# Patient Record
Sex: FEMALE | Race: WHITE | Employment: UNEMPLOYED | URBAN - METROPOLITAN AREA
[De-identification: names, ages, dates, MRNs, and addresses within clinical notes are randomized per-mention and may not be internally consistent; named-entity substitution may affect disease eponyms.]

---

## 2017-09-03 ENCOUNTER — TRANSFERRED RECORDS (OUTPATIENT)
Dept: HEALTH INFORMATION MANAGEMENT | Facility: CLINIC | Age: 52
End: 2017-09-03

## 2017-09-03 ENCOUNTER — HOSPITAL ENCOUNTER (OUTPATIENT)
Dept: CT IMAGING | Facility: HOSPITAL | Age: 52
Discharge: HOME | End: 2017-09-03

## 2017-09-03 DIAGNOSIS — N20.0 STONE IN KIDNEY: ICD-10-CM

## 2019-06-05 ENCOUNTER — HOSPITAL ENCOUNTER (EMERGENCY)
Facility: CLINIC | Age: 54
Discharge: HOME | End: 2019-06-05
Attending: EMERGENCY MEDICINE | Admitting: EMERGENCY MEDICINE
Payer: COMMERCIAL

## 2019-06-05 VITALS
TEMPERATURE: 98.1 F | RESPIRATION RATE: 16 BRPM | WEIGHT: 110 LBS | BODY MASS INDEX: 20.24 KG/M2 | DIASTOLIC BLOOD PRESSURE: 92 MMHG | SYSTOLIC BLOOD PRESSURE: 146 MMHG | OXYGEN SATURATION: 99 % | HEIGHT: 62 IN | HEART RATE: 93 BPM

## 2019-06-05 DIAGNOSIS — R31.0 GROSS HEMATURIA: ICD-10-CM

## 2019-06-05 PROBLEM — F32.A DEPRESSION: Status: ACTIVE | Noted: 2019-06-05

## 2019-06-05 LAB
ALBUMIN UR-MCNC: 100 MG/DL
APPEARANCE UR: ABNORMAL
BILIRUB UR QL STRIP: NEGATIVE
CAOX CRY #/AREA URNS HPF: ABNORMAL /HPF
COLOR UR AUTO: ABNORMAL
GLUCOSE UR STRIP-MCNC: NEGATIVE MG/DL
HGB UR QL STRIP: ABNORMAL
KETONES UR STRIP-MCNC: NEGATIVE MG/DL
LEUKOCYTE ESTERASE UR QL STRIP: NEGATIVE
NITRATE UR QL: NEGATIVE
PH UR STRIP: 7 PH (ref 5–7)
RBC #/AREA URNS AUTO: >182 /HPF (ref 0–2)
SOURCE: ABNORMAL
SP GR UR STRIP: 1.02 (ref 1–1.03)
UROBILINOGEN UR STRIP-MCNC: 0 MG/DL (ref 0–2)
WBC #/AREA URNS AUTO: >182 /HPF (ref 0–5)
WBC CLUMPS #/AREA URNS HPF: PRESENT /HPF

## 2019-06-05 PROCEDURE — 87086 URINE CULTURE/COLONY COUNT: CPT | Performed by: EMERGENCY MEDICINE

## 2019-06-05 PROCEDURE — 81001 URINALYSIS AUTO W/SCOPE: CPT | Performed by: STUDENT IN AN ORGANIZED HEALTH CARE EDUCATION/TRAINING PROGRAM

## 2019-06-05 PROCEDURE — 99284 EMERGENCY DEPT VISIT MOD MDM: CPT | Mod: Z6 | Performed by: EMERGENCY MEDICINE

## 2019-06-05 PROCEDURE — 99283 EMERGENCY DEPT VISIT LOW MDM: CPT | Performed by: EMERGENCY MEDICINE

## 2019-06-05 ASSESSMENT — ENCOUNTER SYMPTOMS
LIGHT-HEADEDNESS: 0
FATIGUE: 0
ABDOMINAL PAIN: 0
FEVER: 0
DYSURIA: 0
SHORTNESS OF BREATH: 0
CHILLS: 0
HEMATURIA: 1
NAUSEA: 0
CHEST TIGHTNESS: 0
FREQUENCY: 0
BACK PAIN: 0
FLANK PAIN: 0

## 2019-06-05 ASSESSMENT — MIFFLIN-ST. JEOR: SCORE: 1067.21

## 2019-06-05 NOTE — ED AVS SNAPSHOT
[facility] Emergency Department  [address] [address]  Phone:  [phone]  Fax:  [phone]                                    Nevin Gonsales   MRN: [id number]    Department:  [facility] Emergency Department   Date of Visit:  6/5/2019           After Visit Summary Signature Page    I have received my discharge instructions, and my questions have been answered. I have discussed any challenges I see with this plan with the nurse or doctor.    ..........................................................................................................................................  Patient/Patient Representative Signature      ..........................................................................................................................................  Patient Representative Print Name and Relationship to Patient    ..................................................               ................................................  Date                                   Time    ..........................................................................................................................................  Reviewed by Signature/Title    ...................................................              ..............................................  Date                                               Time          22EPIC Rev 08/18

## 2019-06-06 NOTE — ED PROVIDER NOTES
History     Chief Complaint   Patient presents with     Hematuria     having some pressure in abdomen but no symptoms of UTI, hx of kidney stones, no flank pain     HPI  Nevin Gonsales is a 53 year old female with a history of frequent kidney stones presenting for evaluation of painless gross hematuria tonight.  Patient reports she has been feeling well recently with no recent symptoms of kidney stones.  Tonight she went to urinate and felt some pressure and release and noticed some blood and possible blood clots or tissue within the toilet bowl.  Denies any dysuria, urgency, or frequency.  Denies any flank pain or abdominal pain that she has normally had with her kidney stones.  She does report she is passed innumerable stones in the past but usually has pain with them.  No history of easy bruising or bleeding.  Not on any anticoagulation.  Denies any abdominal trauma.  Denies any recent weight loss or weight gain.  No history of cancers.    Allergies:  Allergies   Allergen Reactions     Percocet [Oxycodone-Acetaminophen] Nausea and Vomiting       Problem List:    Patient Active Problem List    Diagnosis Date Noted     Depression 06/05/2019     Priority: Medium     Borderline personality disorder (H) 05/28/2015     Priority: Medium     Kidney stones 10/30/2013     Priority: Medium     MYKE I (cervical intraepithelial neoplasia I) 06/05/2013     Priority: Medium     05/13/2013 LSIL  06/05/2013 Brothers: MYKE I   02/08/2016 ASCUS/HPV+  12/14/2016 Brothers: Benign  07/09/2018 LSIL/HPV+  01/08/2019 Brothers: MYKE 1    Plan: Repeat Pap/HPV testing in 1 year (DUE 1/2020)       Dysmenorrhea 08/16/2012     Priority: Medium     Hypertension 07/17/2010     Priority: Medium     Generalized anxiety disorder 04/30/2010     Priority: Medium     Major depressive disorder, recurrent episode, moderate (H) 04/30/2010     Priority: Medium        Past Medical History:    No past medical history on file.    Past Surgical History:    No past  "surgical history on file.    Family History:    No family history on file.    Social History:  Marital Status:   [2]  Social History     Tobacco Use     Smoking status: Not on file   Substance Use Topics     Alcohol use: Not on file     Drug use: Not on file        Medications:      No current outpatient medications on file.      Review of Systems   Constitutional: Negative for chills, fatigue and fever.   HENT: Negative for congestion.    Respiratory: Negative for chest tightness and shortness of breath.    Cardiovascular: Negative for chest pain.   Gastrointestinal: Negative for abdominal pain and nausea.   Genitourinary: Positive for hematuria. Negative for dysuria, flank pain, frequency, pelvic pain, urgency, vaginal bleeding and vaginal discharge.   Musculoskeletal: Negative for back pain.   Skin: Negative for rash.   Neurological: Negative for light-headedness.   All other systems reviewed and are negative.      Physical Exam   BP: (!) 146/92  Pulse: 93  Temp: 98.1  F (36.7  C)  Resp: 16  Height: 157.5 cm (5' 2\")  Weight: 49.9 kg (110 lb)  SpO2: 99 %      Physical Exam   Constitutional: She is oriented to person, place, and time. She appears well-developed and well-nourished. No distress.   HENT:   Head: Normocephalic and atraumatic.   Cardiovascular: Normal rate.   Pulmonary/Chest: Effort normal.   Abdominal: Soft. She exhibits no mass. There is no tenderness. There is no CVA tenderness.   Neurological: She is alert and oriented to person, place, and time.   Skin: Skin is warm and dry. Capillary refill takes less than 2 seconds.   Psychiatric: She has a normal mood and affect.   Nursing note and vitals reviewed.      ED Course        Procedures           Results for orders placed or performed during the hospital encounter of 06/05/19 (from the past 24 hour(s))   UA reflex to Microscopic   Result Value Ref Range    Color Urine Red     Appearance Urine Cloudy     Glucose Urine Negative NEG^Negative mg/dL "    Bilirubin Urine Negative NEG^Negative    Ketones Urine Negative NEG^Negative mg/dL    Specific Gravity Urine 1.016 1.003 - 1.035    Blood Urine Large (A) NEG^Negative    pH Urine 7.0 5.0 - 7.0 pH    Protein Albumin Urine 100 (A) NEG^Negative mg/dL    Urobilinogen mg/dL 0.0 0.0 - 2.0 mg/dL    Nitrite Urine Negative NEG^Negative    Leukocyte Esterase Urine Negative NEG^Negative    Source Midstream Urine     RBC Urine >182 (H) 0 - 2 /HPF    WBC Urine >182 (H) 0 - 5 /HPF    WBC Clumps Present (A) NEG^Negative /HPF    Calcium Oxalate Few (A) NEG^Negative /HPF       Medications - No data to display    Assessments & Plan (with Medical Decision Making)  53-year-old female with history of multiple previous kidney stones presenting for evaluation of painless hematuria tonight.  No recent symptoms of kidney stones.  No recent urinary tract infection symptoms.  No history of cancers or weight loss to suggest a renal or bladder cancer.  Urinating well with no signs of urinary obstruction.  Differentials include a small kidney stones, early asymptomatic cystitis (urine culture ordered from today's urine sample), bladder or renal cancer.  Advised to hydrate and monitor for resolution considering that a small kidney stone causing irritation and bleeding is the most likely cause of her symptoms.  Advised patient and spouse that if symptoms continue or she has a waxing waning continuation of her bleeding, she should follow-up with urology for further evaluation given the consideration for other causes of her hematuria.  Also advised that if she develops any urinary obstruction or other concerning symptoms of blood loss such as lightheadedness, weakness, trouble breathing, or dizziness, to return to the ED for repeat evaluation.  Did discuss consideration for blood work to recheck her kidney function as this is not been done since July.  Patient declined stating she will follow-up with her primary doctor for this next week.     I  have reviewed the nursing notes.    I have reviewed the findings, diagnosis, plan and need for follow up with the patient.          Medication List      There are no discharge medications for this visit.         Final diagnoses:   Gross hematuria       6/5/2019   [facility] EMERGENCY DEPARTMENT     Estrada, Linus Khan MD  06/05/19 1299

## 2019-06-07 LAB
BACTERIA SPEC CULT: NO GROWTH
Lab: NORMAL
SPECIMEN SOURCE: NORMAL

## 2019-06-07 NOTE — RESULT ENCOUNTER NOTE
Final urine culture report is NEGATIVE per [facility] Result protocol.    If NEGATIVE result, no change in treatment, per [facility] Result protocol.

## 2019-07-02 ENCOUNTER — OFFICE VISIT (OUTPATIENT)
Dept: UROLOGY | Facility: CLINIC | Age: 54
End: 2019-07-02
Payer: COMMERCIAL

## 2019-07-02 VITALS
WEIGHT: 110 LBS | HEIGHT: 62 IN | SYSTOLIC BLOOD PRESSURE: 103 MMHG | BODY MASS INDEX: 20.24 KG/M2 | RESPIRATION RATE: 16 BRPM | HEART RATE: 83 BPM | DIASTOLIC BLOOD PRESSURE: 68 MMHG

## 2019-07-02 DIAGNOSIS — N20.9 URINARY TRACT STONES: ICD-10-CM

## 2019-07-02 DIAGNOSIS — R31.0 GROSS HEMATURIA: Primary | ICD-10-CM

## 2019-07-02 PROCEDURE — 99203 OFFICE O/P NEW LOW 30 MIN: CPT | Mod: 25 | Performed by: UROLOGY

## 2019-07-02 PROCEDURE — 52000 CYSTOURETHROSCOPY: CPT | Performed by: UROLOGY

## 2019-07-02 PROCEDURE — 51798 US URINE CAPACITY MEASURE: CPT | Performed by: UROLOGY

## 2019-07-02 RX ORDER — TAMSULOSIN HYDROCHLORIDE 0.4 MG/1
CAPSULE ORAL
Refills: 0 | COMMUNITY
Start: 2018-10-14

## 2019-07-02 RX ORDER — POTASSIUM CITRATE 15 MEQ/1
15 TABLET, EXTENDED RELEASE ORAL
COMMUNITY
Start: 2019-05-14

## 2019-07-02 RX ORDER — TRAZODONE HYDROCHLORIDE 50 MG/1
50 TABLET, FILM COATED ORAL
COMMUNITY
Start: 2015-05-28

## 2019-07-02 RX ORDER — CHLORTHALIDONE 25 MG/1
25 TABLET ORAL
COMMUNITY
Start: 2019-05-14

## 2019-07-02 RX ORDER — GABAPENTIN 300 MG/1
300 CAPSULE ORAL
COMMUNITY
Start: 2016-06-15

## 2019-07-02 RX ORDER — TETRACYCLINE HCL 500 MG
CAPSULE ORAL
COMMUNITY
Start: 2019-05-14

## 2019-07-02 RX ORDER — IBUPROFEN 200 MG
200 TABLET ORAL
COMMUNITY

## 2019-07-02 RX ORDER — VENLAFAXINE HYDROCHLORIDE 150 MG/1
150 CAPSULE, EXTENDED RELEASE ORAL
COMMUNITY
Start: 2016-01-21

## 2019-07-02 RX ORDER — DIPHENOXYLATE HYDROCHLORIDE AND ATROPINE SULFATE 2.5; .025 MG/1; MG/1
1 TABLET ORAL
COMMUNITY
Start: 2018-07-09

## 2019-07-02 ASSESSMENT — MIFFLIN-ST. JEOR: SCORE: 1067.21

## 2019-07-02 NOTE — NURSING NOTE
Pt brought back to the procedure room for a cystoscopy per Dr. Lowry Informed consent obtained, pt prepped in a sterile manner and a uro jet was used.      Scope serial number: [id number] Angelina DALY CMA ([facility])

## 2019-07-02 NOTE — NURSING NOTE
"Chief Complaint   Patient presents with     Consult     hx of kidney stones and hematuria       Initial /68 (BP Location: Left arm, Patient Position: Chair, Cuff Size: Adult Regular)   Pulse 83   Resp 16   Ht 1.575 m (5' 2\")   Wt 49.9 kg (110 lb)   BMI 20.12 kg/m   Estimated body mass index is 20.12 kg/m  as calculated from the following:    Height as of this encounter: 1.575 m (5' 2\").    Weight as of this encounter: 49.9 kg (110 lb).  Medications and allergies reviewed.    Noah DALY CMA ([facility])    "

## 2019-07-02 NOTE — PROGRESS NOTES
Appointment source: New Patient  Patient name: Nevin Gonsales  Urology Staff: Kt Lowry MD    Subjective: This is a 53 year old year old female complaining of gross hematuria.    Developed sense of urgency and passed clots and red urine. Not menstruating at the time.    Strong history of urinary tract stones but no colic at time of hematuria.    Distant history of smoking.    Objective:  Cystoscopy was normal. Limited bimanual examination normal.      Urine culture at the time of the gross hematuria was normal.    Assessment:  Gross hematuria. History of recurrent urinary tract stones.     Plan:  Complete evaluation of gross hematuria with CT scan which will also provide surveillance of current stone burden.    Total time 30 minutes, counseling 20 minutes discussing hematuria and urinary tract stones.

## 2019-07-02 NOTE — NURSING NOTE
Active order to obtain bladder scan? Yes   Name of ordering provider:  Dr. Lowry  Bladder scan preformed post void Yes  Bladder scan revealed 0ML  Provider notified?  Yes    Noah DALY CMA

## 2019-07-02 NOTE — PATIENT INSTRUCTIONS
Per physician instructions.    If you have questions or concerns on any instructions given to you by your provider today or if you need to schedule an appointment, you can reach us at [phone].    Thank you!

## 2019-07-05 ENCOUNTER — HOSPITAL ENCOUNTER (OUTPATIENT)
Dept: CT IMAGING | Facility: CLINIC | Age: 54
Discharge: HOME | End: 2019-07-05
Attending: UROLOGY | Admitting: UROLOGY
Payer: COMMERCIAL

## 2019-07-05 DIAGNOSIS — R31.0 GROSS HEMATURIA: ICD-10-CM

## 2019-07-05 PROCEDURE — 74178 CT ABD&PLV WO CNTR FLWD CNTR: CPT

## 2019-07-05 PROCEDURE — 25000125 ZZHC RX 250: Performed by: RADIOLOGY

## 2019-07-05 PROCEDURE — 25000128 H RX IP 250 OP 636: Performed by: RADIOLOGY

## 2019-07-05 RX ORDER — IOPAMIDOL 755 MG/ML
100 INJECTION, SOLUTION INTRAVASCULAR ONCE
Status: COMPLETED | OUTPATIENT
Start: 2019-07-05 | End: 2019-07-05

## 2019-07-05 RX ADMIN — SODIUM CHLORIDE 80 ML: 9 INJECTION, SOLUTION INTRAVENOUS at 09:11

## 2019-07-05 RX ADMIN — IOPAMIDOL 100 ML: 755 INJECTION, SOLUTION INTRAVENOUS at 09:11

## 2019-07-10 ENCOUNTER — TELEPHONE (OUTPATIENT)
Dept: UROLOGY | Facility: CLINIC | Age: 54
End: 2019-07-10

## 2019-07-10 NOTE — TELEPHONE ENCOUNTER
Pt advised provider out of town and should hear early next week regarding result.  Isabel Silvestre RN  [facility]

## 2019-07-10 NOTE — TELEPHONE ENCOUNTER
Reason for Call:  Request for results:    Name of test or procedure: CT     Date of test of procedure: 7/5    Location of the test or procedure: [place]    OK to leave the result message on voice mail or with a family member? YES    Phone number Patient can be reached at:  Home number on file [phone] (home)    Additional comments: pt calling for CT results    Call taken on 7/10/2019 at 10:01 AM by Naomi Carcamo

## 2019-07-22 ENCOUNTER — PRE VISIT (OUTPATIENT)
Dept: UROLOGY | Facility: CLINIC | Age: 54
End: 2019-07-22

## 2019-07-22 NOTE — TELEPHONE ENCOUNTER
Chief Complaint : New-    Hx/Sx: Hematuria     Records/Orders: Available; pt had cysto 7/2/19    Pt Contacted: N/A    At Rooming: Normal

## 2019-07-22 NOTE — TELEPHONE ENCOUNTER
MEDICAL RECORDS REQUEST   [facility]  Urology Clinic  [address] [place] [address]  PHONE: [phone]  Fax: [phone]        FUTURE VISIT INFORMATION                                                   Nevin Ilda, : 1965 scheduled for future visit at [facility]    APPOINTMENT INFORMATION:    Date: 19 9AM     Provider:  Adonay Dupont MD     Reason for Visit/Diagnosis: Gross Hematuria     REFERRAL INFORMATION:    Referring provider:  Kt Lowry     Specialty: MD     Referring providers clinic:  [facility]     Clinic contact number:  [phone]    RECORDS REQUESTED FOR VISIT                                                     NOTES  STATUS/DETAILS   OFFICE NOTE from referring provider  yes   OFFICE NOTE from other specialist  no   DISCHARGE SUMMARY from hospital  no   DISCHARGE REPORT from the ER  no   OPERATIVE REPORT  yes   MEDICATION LIST  no   LABS     URINALYSIS (UA)  yes     PRE-VISIT CHECKLIST      Record collection complete Yes- All recs in Epic   Images in  PACS   19 CT Abd Pelvis    9/3/17 CT Abd Pelvis    Appointment appropriately scheduled           (right time/right provider) Yes   MyChart activation If no, please explain: In process    Questionnaire complete If no, please explain: In process      Completed by: Milagros Ashton

## 2019-07-23 ENCOUNTER — MEDICAL CORRESPONDENCE (OUTPATIENT)
Dept: HEALTH INFORMATION MANAGEMENT | Facility: CLINIC | Age: 54
End: 2019-07-23

## 2019-07-23 ENCOUNTER — PRE VISIT (OUTPATIENT)
Dept: UROLOGY | Facility: CLINIC | Age: 54
End: 2019-07-23

## 2019-07-23 ENCOUNTER — OFFICE VISIT (OUTPATIENT)
Dept: UROLOGY | Facility: CLINIC | Age: 54
End: 2019-07-23
Payer: COMMERCIAL

## 2019-07-23 VITALS
BODY MASS INDEX: 20.3 KG/M2 | HEART RATE: 77 BPM | DIASTOLIC BLOOD PRESSURE: 81 MMHG | WEIGHT: 111 LBS | SYSTOLIC BLOOD PRESSURE: 127 MMHG | RESPIRATION RATE: 16 BRPM

## 2019-07-23 DIAGNOSIS — N20.0 KIDNEY STONE: Primary | ICD-10-CM

## 2019-07-23 DIAGNOSIS — N20.0 KIDNEY STONE: ICD-10-CM

## 2019-07-23 PROBLEM — R51.9 HA (HEADACHE): Status: ACTIVE | Noted: 2019-07-23

## 2019-07-23 LAB
ANION GAP SERPL CALCULATED.3IONS-SCNC: 4 MMOL/L (ref 3–14)
BUN SERPL-MCNC: 28 MG/DL (ref 7–30)
CALCIUM SERPL-MCNC: 9.6 MG/DL (ref 8.5–10.1)
CHLORIDE SERPL-SCNC: 102 MMOL/L (ref 94–109)
CO2 SERPL-SCNC: 31 MMOL/L (ref 20–32)
CREAT SERPL-MCNC: 0.84 MG/DL (ref 0.52–1.04)
DEPRECATED CALCIDIOL+CALCIFEROL SERPL-MC: 35 UG/L (ref 20–75)
GFR SERPL CREATININE-BSD FRML MDRD: 81 ML/MIN/{1.73_M2}
GLUCOSE SERPL-MCNC: 94 MG/DL (ref 70–99)
POTASSIUM SERPL-SCNC: 4.1 MMOL/L (ref 3.4–5.3)
SODIUM SERPL-SCNC: 137 MMOL/L (ref 133–144)
URATE SERPL-MCNC: 4.3 MG/DL (ref 2.6–6)

## 2019-07-23 ASSESSMENT — PAIN SCALES - GENERAL: PAINLEVEL: NO PAIN (0)

## 2019-07-23 NOTE — PROGRESS NOTES
Urology Clinic    Adonay Dupont MD  Date of Service: 2019     Name: Nevin Gonsales  MRN: [id number]  Age: 53 year old  : 1965  Referring provider: Referred Self     Assessment and Plan:  Kidney stone  - Litholink: Clinic Lab Order  - Basic metabolic panel  - Uric acid  - Vitamin D deficiency screening       Assessment:  Nevin Gonsales  is a 53 year old female multiple non-obstructing bilateral renal stones.  She has had multiple stones over the years which she has generally been able to pass although she did require lithotripsy in 2013.  I recommend metabolic workup to further characterize her urine.    Plan:  BMP, uric acid, Vitamin D, and Litholink ordered today and I will see her back to discuss results and any possible lifestyle changes which could decrease future stone formation.     Follow up: RTC after Litholink    ---------------------------------------------------------------------------------------------------------------------    Chief Complaint:   Hematuria, kidney stones    HPI:   Nevin Gonsales  is a 53 year old female with a history of recurrent kidney stones and hematuria.  The patient reports a long history of kidney stones with passing at least 15 stones.  She did have a lithotripsy procedure in 2013.  Although she has stones on both side on CT scan, her flank and abdominal pain is always on her right side.  She has never had infections associated with her kidney stones.  Most recently she had an episode of gross hematuria, although she did not have any flank or abdominal pain associated with these.  Cystoscopy with Dr. Lowry was performed and was normal    She does not like to drink a lot of fluid as she feels that stirs up her stones and makes things worse.  No one has ever discussed a specific diet to prevent kidney stones.  Previous stones were primarily calcium oxalate.    Stone History is as Follows:  First stone: years ago  Number of stone surgeries:  one  Number of stones passed spontaneously: at least 15  History of UTI: no  Prior Stone Analysis: calcium oxalate  Family History Nephrolithiasis: yes, mother, sister  Prior Metabolic Workup: none     Review of Systems:   Pertinent items are noted in HPI or as below, remainder of complete ROS is negative.      Active Medications:      Apple Cider Vinegar 500 MG TABS, , Disp: , Rfl:      aspirin-acetaminophen-caffeine (EXCEDRIN EXTRA STRENGTH) 250-250-65 MG tablet, Take 1 tablet by mouth, Disp: , Rfl:      chlorthalidone (HYGROTON) 25 MG tablet, Take 25 mg by mouth, Disp: , Rfl:      gabapentin (NEURONTIN) 300 MG capsule, Take 300 mg by mouth, Disp: , Rfl:      ibuprofen (ADVIL/MOTRIN) 200 MG tablet, Take 200 mg by mouth, Disp: , Rfl:      Multiple Vitamin (MULTI-VITAMINS) TABS, Take 1 tablet by mouth, Disp: , Rfl:      potassium citrate 15 MEQ (1620 MG) TBCR, Take 15 mEq by mouth, Disp: , Rfl:      tamsulosin (FLOMAX) 0.4 MG capsule, TAKE 0.4 MG BY MOUTH ONCE DAILY AFTER A MEAL., Disp: , Rfl: 0     traZODone (DESYREL) 50 MG tablet, Take 50 mg by mouth, Disp: , Rfl:      venlafaxine (EFFEXOR XR) 150 MG 24 hr capsule, Take 150 mg by mouth, Disp: , Rfl:       Allergies:   Percocet       Past Medical History:  Kidney stones  Hypertension  Generalized anxiety disorder   Depression  MYKE I  Borderline personality  Major depressive disorder      Past Surgical History:  Colposcopy, 2013, 2016, 2019  Cystoscopy, left ureteroscopy with holmium laser lithotripsy, right ureteroscopy with basket extraction, right stent placement 2013  Endometrial ablation 2010  [place] teeth extraction    Family History:   Hypertension - brother, mother, sister   Prostate cancer - father  Alzheimer's disease - father    Stroke - mother   Breast cancer - paternal grandmother      Social History:   Presents to clinic alone.  Tobacco Use: Former smoker  Alcohol Use: No alcohol use.   PCP: Zelalem Silvestre      Physical Exam:   Patient is a 53  year old  female   Vitals: Blood pressure 127/81, pulse 77, resp. rate 16, weight 50.3 kg (111 lb).  Notable Findings on Exam: Well-nourished female in no apparent distress   No CVAT bilaterally     Imaging:   I have personally reviewed the results of the below imaging studies. The results were discussed with the patient.     Results for orders placed or performed during the hospital encounter of 07/05/19   CT Abdomen Pelvis w/o & w Contrast [IZK402]    Narrative    CT ABDOMEN PELVIS W/O & W CONTRAST 7/5/2019 9:30 AM    HISTORY: Gross hematuria.    CONTRAST:  100 mL Isovue 370.    TECHNIQUE: Abdomen and pelvis CT are performed without and with IV  contrast.    The non-IV enhanced the images are utilized mainly to assess for  urolithiasis or other pathological calcifications. On the delayed IV  enhanced portion of the study the kidneys, ureters, and bladder are  assessed.  The liver, spleen, pancreas, adrenal glands,  retroperitoneum, and abdominal aorta are also assessed. Finally,  pelvic anatomy is assessed on the pelvis portion of the CT.    Radiation dose for this scan is reduced using automated exposure  control, adjustment of mA and/or kV according to patient size, or  iterative reconstruction technique.    COMPARISON: None.    FINDINGS:    Abdomen: There are at least four nonobstructing calculi in the right  kidney. The largest one measures 3 mm. There are numerous  nonobstructing calculi in the left kidney. The largest one measures 5  mm. There is a 4.3 cm benign cyst in the left kidney. There also are a  few subcentimeter tiny low density lesions in each kidney which are  too small to characterize. Statistically they would represent cysts.  The ureters are segmentally opacified with excreted contrast. They  show no focal finding.    Pelvis: No bladder calculi are demonstrated. The bladder shows no  gross abnormality.      Impression    IMPRESSION:  Bilateral nonobstructing renal calculi are present.    NOMAN  MD MARU      Laboratory:   I personally reviewed all applicable laboratory data and went over findings with patient  Significant for:    Recent Labs   Lab Test 06/05/19  2204   SG 1.016   URINEPH 7.0   NITRITE Negative   RBCU >182*   WBCU >182*       Scribe Disclosure:  I, Hannah Bay, am serving as a scribe to document services personally performed by Adonay Dupont MD at this visit, based upon the provider's statements to me. All documentation has been reviewed by the aforementioned provider prior to being entered into the official medical record.            Answers for HPI/ROS submitted by the patient on 7/23/2019   General Symptoms: No  Skin Symptoms: No  HENT Symptoms: No  EYE SYMPTOMS: No  HEART SYMPTOMS: No  LUNG SYMPTOMS: No  INTESTINAL SYMPTOMS: No  URINARY SYMPTOMS: No  GYNECOLOGIC SYMPTOMS: No  BREAST SYMPTOMS: No  SKELETAL SYMPTOMS: No  BLOOD SYMPTOMS: No  NERVOUS SYSTEM SYMPTOMS: No  MENTAL HEALTH SYMPTOMS: No

## 2019-07-23 NOTE — NURSING NOTE
Chief Complaint   Patient presents with     Hematuria     Patient is here to discuss hematuria     Marsha Esteves CMA 9:55 AM on 7/23/2019.

## 2019-07-23 NOTE — LETTER
2019       RE: Nevin Gonsales  [address] [facility] [address]     Dear Colleague,    Thank you for referring your patient, Nevin Gonsales, to the [facility] UROLOGY AND INST FOR PROSTATE AND UROLOGIC CANCERS at [facility]. Please see a copy of my visit note below.      Urology Clinic    Adonay Dupont MD  Date of Service: 2019     Name: Nevin Gonsales  MRN: [id number]  Age: 53 year old  : 1965  Referring provider: Referred Self     Assessment and Plan:  Kidney stone  - Litholink: Clinic Lab Order  - Basic metabolic panel  - Uric acid  - Vitamin D deficiency screening       Assessment:  Nevin Gonsales  is a 53 year old female multiple non-obstructing bilateral renal stones.  She has had multiple stones over the years which she has generally been able to pass although she did require lithotripsy in .  I recommend metabolic workup to further characterize her urine.    Plan:  BMP, uric acid, Vitamin D, and Litholink ordered today and I will see her back to discuss results and any possible lifestyle changes which could decrease future stone formation.     Follow up: RTC after Litholink    ---------------------------------------------------------------------------------------------------------------------    Chief Complaint:   Hematuria, kidney stones    HPI:   Nevin Gonsales  is a 53 year old female with a history of recurrent kidney stones and hematuria.  The patient reports a long history of kidney stones with passing at least 15 stones.  She did have a lithotripsy procedure in .  Although she has stones on both side on CT scan, her flank and abdominal pain is always on her right side.  She has never had infections associated with her kidney stones.  Most recently she had an episode of gross hematuria, although she did not have any flank or abdominal pain associated with these.  Cystoscopy with Dr. Lowry was performed and was  normal    She does not like to drink a lot of fluid as she feels that stirs up her stones and makes things worse.  No one has ever discussed a specific diet to prevent kidney stones.  Previous stones were primarily calcium oxalate.    Stone History is as Follows:  First stone: years ago  Number of stone surgeries: one  Number of stones passed spontaneously: at least 15  History of UTI: no  Prior Stone Analysis: calcium oxalate  Family History Nephrolithiasis: yes, mother, sister  Prior Metabolic Workup: none     Review of Systems:   Pertinent items are noted in HPI or as below, remainder of complete ROS is negative.      Active Medications:      Apple Cider Vinegar 500 MG TABS, , Disp: , Rfl:      aspirin-acetaminophen-caffeine (EXCEDRIN EXTRA STRENGTH) 250-250-65 MG tablet, Take 1 tablet by mouth, Disp: , Rfl:      chlorthalidone (HYGROTON) 25 MG tablet, Take 25 mg by mouth, Disp: , Rfl:      gabapentin (NEURONTIN) 300 MG capsule, Take 300 mg by mouth, Disp: , Rfl:      ibuprofen (ADVIL/MOTRIN) 200 MG tablet, Take 200 mg by mouth, Disp: , Rfl:      Multiple Vitamin (MULTI-VITAMINS) TABS, Take 1 tablet by mouth, Disp: , Rfl:      potassium citrate 15 MEQ (1620 MG) TBCR, Take 15 mEq by mouth, Disp: , Rfl:      tamsulosin (FLOMAX) 0.4 MG capsule, TAKE 0.4 MG BY MOUTH ONCE DAILY AFTER A MEAL., Disp: , Rfl: 0     traZODone (DESYREL) 50 MG tablet, Take 50 mg by mouth, Disp: , Rfl:      venlafaxine (EFFEXOR XR) 150 MG 24 hr capsule, Take 150 mg by mouth, Disp: , Rfl:       Allergies:   Percocet       Past Medical History:  Kidney stones  Hypertension  Generalized anxiety disorder   Depression  MYKE I  Borderline personality  Major depressive disorder      Past Surgical History:  Colposcopy, 2013, 2016, 2019  Cystoscopy, left ureteroscopy with holmium laser lithotripsy, right ureteroscopy with basket extraction, right stent placement 2013  Endometrial ablation 2010  [place] teeth extraction    Family History:   Hypertension  - brother, mother, sister   Prostate cancer - father  Alzheimer's disease - father    Stroke - mother   Breast cancer - paternal grandmother      Social History:   Presents to clinic alone.  Tobacco Use: Former smoker  Alcohol Use: No alcohol use.   PCP: Zelalem Silvestre      Physical Exam:   Patient is a 53 year old  female   Vitals: Blood pressure 127/81, pulse 77, resp. rate 16, weight 50.3 kg (111 lb).  Notable Findings on Exam: Well-nourished female in no apparent distress   No CVAT bilaterally     Imaging:   I have personally reviewed the results of the below imaging studies. The results were discussed with the patient.     Results for orders placed or performed during the hospital encounter of 07/05/19   CT Abdomen Pelvis w/o & w Contrast [IGQ754]    Narrative    CT ABDOMEN PELVIS W/O & W CONTRAST 7/5/2019 9:30 AM    HISTORY: Gross hematuria.    CONTRAST:  100 mL Isovue 370.    TECHNIQUE: Abdomen and pelvis CT are performed without and with IV  contrast.    The non-IV enhanced the images are utilized mainly to assess for  urolithiasis or other pathological calcifications. On the delayed IV  enhanced portion of the study the kidneys, ureters, and bladder are  assessed.  The liver, spleen, pancreas, adrenal glands,  retroperitoneum, and abdominal aorta are also assessed. Finally,  pelvic anatomy is assessed on the pelvis portion of the CT.    Radiation dose for this scan is reduced using automated exposure  control, adjustment of mA and/or kV according to patient size, or  iterative reconstruction technique.    COMPARISON: None.    FINDINGS:    Abdomen: There are at least four nonobstructing calculi in the right  kidney. The largest one measures 3 mm. There are numerous  nonobstructing calculi in the left kidney. The largest one measures 5  mm. There is a 4.3 cm benign cyst in the left kidney. There also are a  few subcentimeter tiny low density lesions in each kidney which are  too small to  characterize. Statistically they would represent cysts.  The ureters are segmentally opacified with excreted contrast. They  show no focal finding.    Pelvis: No bladder calculi are demonstrated. The bladder shows no  gross abnormality.      Impression    IMPRESSION:  Bilateral nonobstructing renal calculi are present.    NOMAN MAHONEY MD      Laboratory:   I personally reviewed all applicable laboratory data and went over findings with patient  Significant for:    Recent Labs   Lab Test 06/05/19  2204   SG 1.016   URINEPH 7.0   NITRITE Negative   RBCU >182*   WBCU >182*       Scribe Disclosure:  I, Hannah Bay, am serving as a scribe to document services personally performed by Adonay Dupont MD at this visit, based upon the provider's statements to me. All documentation has been reviewed by the aforementioned provider prior to being entered into the official medical record.            Answers for HPI/ROS submitted by the patient on 7/23/2019   General Symptoms: No  Skin Symptoms: No  HENT Symptoms: No  EYE SYMPTOMS: No  HEART SYMPTOMS: No  LUNG SYMPTOMS: No  INTESTINAL SYMPTOMS: No  URINARY SYMPTOMS: No  GYNECOLOGIC SYMPTOMS: No  BREAST SYMPTOMS: No  SKELETAL SYMPTOMS: No  BLOOD SYMPTOMS: No  NERVOUS SYSTEM SYMPTOMS: No  MENTAL HEALTH SYMPTOMS: No      Again, thank you for allowing me to participate in the care of your patient.      Sincerely,    Adonay Dupont MD

## 2019-07-23 NOTE — PATIENT INSTRUCTIONS
Please complete the LithoLink 24hr Urine collection x2 . They will mail you the kit with instructions on completing the collection and mailing it back to them. If you do not receive the LithoLink in 7-10 days please call [phone]. Once you complete the kit and return it, and we get the results we will contact you to schedule a follow up appointment if one is not already made.    Please follow up with Dr. Dupont in eight (8) weeks.    Please have your blood work done on your way out today.    It was a pleasure meeting with you today.  Thank you for allowing me and my team the privilege of caring for you today.  YOU are the reason we are here, and I truly hope we provided you with the excellent service you deserve.  Please let us know if there is anything else we can do for you so that we can be sure you are leaving completely satisfied with your care experience.      Keegan Bales

## 2019-08-02 ENCOUNTER — TRANSFERRED RECORDS (OUTPATIENT)
Dept: HEALTH INFORMATION MANAGEMENT | Facility: CLINIC | Age: 54
End: 2019-08-02

## 2019-08-06 ENCOUNTER — DOCUMENTATION ONLY (OUTPATIENT)
Dept: CARE COORDINATION | Facility: CLINIC | Age: 54
End: 2019-08-06

## 2019-08-21 ENCOUNTER — PRE VISIT (OUTPATIENT)
Dept: UROLOGY | Facility: CLINIC | Age: 54
End: 2019-08-21

## 2019-08-21 NOTE — TELEPHONE ENCOUNTER
Reason for Visit: 6 week follow up    Diagnosis: Kidney stones    Orders/Procedures/Records: Records available, labs (7/23)    Contact Patient: N/A    Rooming Requirements: Kraig Simpson  08/21/19  10:05 AM

## 2019-09-03 ENCOUNTER — TRANSFERRED RECORDS (OUTPATIENT)
Dept: HEALTH INFORMATION MANAGEMENT | Facility: CLINIC | Age: 54
End: 2019-09-03

## 2019-09-03 ENCOUNTER — OFFICE VISIT (OUTPATIENT)
Dept: UROLOGY | Facility: CLINIC | Age: 54
End: 2019-09-03
Payer: COMMERCIAL

## 2019-09-03 VITALS — BODY MASS INDEX: 20.43 KG/M2 | WEIGHT: 111 LBS | HEIGHT: 62 IN

## 2019-09-03 DIAGNOSIS — N20.0 KIDNEY STONE: Primary | ICD-10-CM

## 2019-09-03 ASSESSMENT — PAIN SCALES - GENERAL: PAINLEVEL: NO PAIN (0)

## 2019-09-03 ASSESSMENT — MIFFLIN-ST. JEOR: SCORE: 1071.74

## 2019-09-03 NOTE — NURSING NOTE
"Chief Complaint   Patient presents with     Follow Up     Kidney stones       Height 1.575 m (5' 2\"), weight 50.3 kg (111 lb). Body mass index is 20.3 kg/m .    Patient Active Problem List   Diagnosis     Kidney stones     Hypertension     Generalized anxiety disorder     Dysmenorrhea     Depression     MYKE I (cervical intraepithelial neoplasia I)     Borderline personality disorder (H)     Major depressive disorder, recurrent episode, moderate (H)     HA (headache)     Low back pain       Allergies   Allergen Reactions     Percocet [Oxycodone-Acetaminophen] Nausea and Vomiting       Current Outpatient Medications   Medication Sig Dispense Refill     Apple Cider Vinegar 500 MG TABS        aspirin-acetaminophen-caffeine (EXCEDRIN EXTRA STRENGTH) 250-250-65 MG tablet Take 1 tablet by mouth       chlorthalidone (HYGROTON) 25 MG tablet Take 25 mg by mouth       gabapentin (NEURONTIN) 300 MG capsule Take 300 mg by mouth       ibuprofen (ADVIL/MOTRIN) 200 MG tablet Take 200 mg by mouth       Multiple Vitamin (MULTI-VITAMINS) TABS Take 1 tablet by mouth       potassium citrate 15 MEQ (1620 MG) TBCR Take 15 mEq by mouth       tamsulosin (FLOMAX) 0.4 MG capsule TAKE 0.4 MG BY MOUTH ONCE DAILY AFTER A MEAL.  0     traZODone (DESYREL) 50 MG tablet Take 50 mg by mouth       venlafaxine (EFFEXOR XR) 150 MG 24 hr capsule Take 150 mg by mouth         Social History     Tobacco Use     Smoking status: Former Smoker     Smokeless tobacco: Never Used   Substance Use Topics     Alcohol use: None     Drug use: None       Delfina Simpson, EMT  9/3/2019  12:05 PM     "

## 2019-09-03 NOTE — PROGRESS NOTES
Urology Clinic    Adonay Dupont MD  Date of Service: 2019     Name: Nevin Gonsales  MRN: [id number]  Age: 53 year old  : 1965  Referring provider: WIL Lowry     Assessment and Plan:  Assessment:  Nevin Gonsales  is a 53 year old female with multiple non-obstructing bilateral renal stones. She has had multiple stones over the years which she has generally been able to pass although she did require lithotripsy in . Recent 24 hour urine study showed low urine volume and high oxalates.     Plan:  We reviewed general recommendations to prevent kidney stones including the followin) High fluid intake. Recommend 3 liters of fluid daily to produce goal of 2.5L of urine.    2) Low oxalate diet. We discussed foods that are particularly high in oxalate including spinach, rhubarb, beets, nuts, nut butters, and black teas.     3) Low salt diet. Discussed common foods with high amounts of salt as well as dietary strategies to minimize sodium.     4) Modest animal protein. Recommend limiting animal protein to one serving or less daily.     5) Normal dietary calcium.    We discussed participation in the smart water bottle study. Our research coordinator will come over today to enroll her.     We will repeat at follow up 24 hour urine study in 2 months.   ______________________________________________________________________    HPI  Nevin Gonsales is a 53 year old female with a history of recurrent kidney stones and hematuria.  She reports a long history of kidney stones with passing at least 15 stones.  She did have a lithotripsy procedure in .  Although she has stones on both side on CT scan, her flank and abdominal pain is always on her right side.  She has never had infections associated with her kidney stones.  Most recently she had an episode of gross hematuria, although she did not have any flank or abdominal pain associated with these.  Cystoscopy with Dr. Lowry was performed  "and was normal     She does not like to drink a lot of fluid as she feels that stirs up her stones and makes things worse. Previous stones were primarily calcium oxalate. She presents today to discuss metabolic workup.      She is on chlorthalidone for blood pressure.     Stone History is as Follows:  First stone: years ago  Number of stone surgeries: one  Number of stones passed spontaneously: at least 15  History of UTI: no  Prior Stone Analysis: calcium oxalate  Family History Nephrolithiasis: yes, mother, sister  Prior Metabolic Workup: none      Review of Systems:   Pertinent items are noted in HPI or as below, remainder of complete ROS is negative.      Physical Exam:   Patient is a 53 year old  female   Vitals: Height 1.575 m (5' 2\"), weight 50.3 kg (111 lb).  Notable Findings on Exam: Well-nourished female in no apparent distress     Laboratory:   I personally reviewed all applicable laboratory data and went over findings with patient  Significant for:    Component      Latest Ref Rng & Units 7/23/2019   Vitamin D Deficiency screening      20 - 75 ug/L 35   Uric Acid      2.6 - 6.0 mg/dL 4.3     BMP RESULTS:  Recent Labs   Lab Test 07/23/19  1054      POTASSIUM 4.1   CHLORIDE 102   CO2 31   ANIONGAP 4   GLC 94   BUN 28   CR 0.84   GFRESTIMATED 81   GFRESTBLACK >90   BRENNEN 9.6       UA RESULTS:   Recent Labs   Lab Test 06/05/19  2204   SG 1.016   URINEPH 7.0   NITRITE Negative   RBCU >182*   WBCU >182*       CALCIUM RESULTS  Lab Results   Component Value Date    BRENNEN 9.6 07/23/2019         Imaging:   I personally reviewed all applicable imaging and went over the below findings with patient.    CT abdomen pelvis with & without contrast 07/05/2019:   FINDINGS:     Abdomen: There are at least four nonobstructing calculi in the right  kidney. The largest one measures 3 mm. There are numerous  nonobstructing calculi in the left kidney. The largest one measures 5  mm. There is a 4.3 cm benign cyst in the left " kidney. There also are a  few subcentimeter tiny low density lesions in each kidney which are  too small to characterize. Statistically they would represent cysts.  The ureters are segmentally opacified with excreted contrast. They  show no focal finding.     Pelvis: No bladder calculi are demonstrated. The bladder shows no  gross abnormality.                                                                IMPRESSION:  Bilateral nonobstructing renal calculi are present.  Per radiology.     Scribe Disclosure:  ICriss, am serving as a scribe to document services personally performed by Adonay Dupont MD at this visit, based upon the provider's statements to me. All documentation has been reviewed by the aforementioned provider prior to being entered into the official medical record.

## 2019-09-03 NOTE — PATIENT INSTRUCTIONS
Please complete your 24 Hour Urine test when it ships to your house.      It was a pleasure meeting with you today.  Thank you for allowing me and my team the privilege of caring for you today.  YOU are the reason we are here, and I truly hope we provided you with the excellent service you deserve.  Please let us know if there is anything else we can do for you so that we can be sure you are leaving completely satisfied with your care experience.

## 2019-09-03 NOTE — LETTER
9/3/2019       RE: Nevin Gonsales  [address] [facility] [address]     Dear Colleague,    Thank you for referring your patient, Nevin Gonsales, to the [facility] UROLOGY AND INST FOR PROSTATE AND UROLOGIC CANCERS at [facility]. Please see a copy of my visit note below.      Urology Clinic    Adonay Dupont MD  Date of Service: 2019     Name: Nevin Gonsales  MRN: [id number]  Age: 53 year old  : 1965  Referring provider: WIL Lowry     Assessment and Plan:  Assessment:  Nevin Gonsales  is a 53 year old female with multiple non-obstructing bilateral renal stones. She has had multiple stones over the years which she has generally been able to pass although she did require lithotripsy in . Recent 24 hour urine study showed low urine volume and high oxalates.     Plan:  We reviewed general recommendations to prevent kidney stones including the followin) High fluid intake. Recommend 3 liters of fluid daily to produce goal of 2.5L of urine.    2) Low oxalate diet. We discussed foods that are particularly high in oxalate including spinach, rhubarb, beets, nuts, nut butters, and black teas.     3) Low salt diet. Discussed common foods with high amounts of salt as well as dietary strategies to minimize sodium.     4) Modest animal protein. Recommend limiting animal protein to one serving or less daily.     5) Normal dietary calcium.    We discussed participation in the smart water bottle study. Our research coordinator will come over today to enroll her.     We will repeat at follow up 24 hour urine study in 2 months.   ______________________________________________________________________    HPI  Nevin Gonsales is a 53 year old female with a history of recurrent kidney stones and hematuria.   She reports a long history of kidney stones with passing at least 15 stones.  She did have a lithotripsy procedure in .  Although she has stones  "on both side on CT scan, her flank and abdominal pain is always on her right side.  She has never had infections associated with her kidney stones.  Most recently she had an episode of gross hematuria, although she did not have any flank or abdominal pain associated with these.  Cystoscopy with Dr. Lowry was performed and was normal     She does not like to drink a lot of fluid as she feels that stirs up her stones and makes things worse. Previous stones were primarily calcium oxalate. She presents today to discuss metabolic workup.      She is on chlorthalidone for blood pressure.     Stone History is as Follows:  First stone: years ago  Number of stone surgeries: one  Number of stones passed spontaneously: at least 15  History of UTI: no  Prior Stone Analysis: calcium oxalate  Family History Nephrolithiasis: yes, mother, sister  Prior Metabolic Workup: none      Review of Systems:   Pertinent items are noted in HPI or as below, remainder of complete ROS is negative.      Physical Exam:   Patient is a 53 year old  female   Vitals: Height 1.575 m (5' 2\"), weight 50.3 kg (111 lb).  Notable Findings on Exam: Well-nourished female in no apparent distress     Laboratory:   I personally reviewed all applicable laboratory data and went over findings with patient  Significant for:    Component      Latest Ref Rng & Units 7/23/2019   Vitamin D Deficiency screening      20 - 75 ug/L 35   Uric Acid      2.6 - 6.0 mg/dL 4.3     BMP RESULTS:  Recent Labs   Lab Test 07/23/19  1054      POTASSIUM 4.1   CHLORIDE 102   CO2 31   ANIONGAP 4   GLC 94   BUN 28   CR 0.84   GFRESTIMATED 81   GFRESTBLACK >90   BRENNEN 9.6       UA RESULTS:   Recent Labs   Lab Test 06/05/19  2204   SG 1.016   URINEPH 7.0   NITRITE Negative   RBCU >182*   WBCU >182*       CALCIUM RESULTS  Lab Results   Component Value Date    BRENNEN 9.6 07/23/2019         Imaging:   I personally reviewed all applicable imaging and went over the below findings with " patient.    CT abdomen pelvis with & without contrast 07/05/2019:   FINDINGS:     Abdomen: There are at least four nonobstructing calculi in the right  kidney. The largest one measures 3 mm. There are numerous  nonobstructing calculi in the left kidney. The largest one measures 5  mm. There is a 4.3 cm benign cyst in the left kidney. There also are a  few subcentimeter tiny low density lesions in each kidney which are  too small to characterize. Statistically they would represent cysts.  The ureters are segmentally opacified with excreted contrast. They  show no focal finding.     Pelvis: No bladder calculi are demonstrated. The bladder shows no  gross abnormality.                                                                IMPRESSION:  Bilateral nonobstructing renal calculi are present.  Per radiology.     Scribe Disclosure:  I, Criss Rosenberg, am serving as a scribe to document services personally performed by Adonay Dupont MD at this visit, based upon the provider's statements to me. All documentation has been reviewed by the aforementioned provider prior to being entered into the official medical record.     Again, thank you for allowing me to participate in the care of your patient.      Sincerely,    Adonay Dupont MD

## 2019-09-09 ENCOUNTER — TELEPHONE (OUTPATIENT)
Dept: UROLOGY | Facility: CLINIC | Age: 54
End: 2019-09-09

## 2019-09-09 NOTE — TELEPHONE ENCOUNTER
Patient called and stated she is so nervous about her diet for kidney stones.  I told her that she needs to try to improve alittle each week  Do not cut out all of the foods she likes but please increase the water intake and use alittle lemon.  She was grateful for suggestions Sonja Braun LPN Staff Nurse

## 2019-09-09 NOTE — TELEPHONE ENCOUNTER
[facility] Call Center    Phone Message    May a detailed message be left on voicemail: yes    Reason for Call: Other: Pt calling asking for a call back feeling overwhelmed by new diet restricitons and is wanting to speak to a nurse about it, some literature is confusing.      Action Taken: Message routed to:  [facility] ([facility]): johnnie uro.

## 2019-10-18 ENCOUNTER — TRANSFERRED RECORDS (OUTPATIENT)
Dept: HEALTH INFORMATION MANAGEMENT | Facility: CLINIC | Age: 54
End: 2019-10-18

## 2019-11-11 ENCOUNTER — PRE VISIT (OUTPATIENT)
Dept: UROLOGY | Facility: CLINIC | Age: 54
End: 2019-11-11

## 2019-11-11 NOTE — TELEPHONE ENCOUNTER
Reason for Visit: Discuss Litholink results    Diagnosis: Kidney Stones    Orders/Procedures/Records: Records available, Litholink results received 10/18    Contact Patient: N/A    Rooming Requirements: Televisit, check pt in      Delfina Simpson  11/11/19  5:32 PM

## 2019-11-26 ENCOUNTER — APPOINTMENT (OUTPATIENT)
Dept: UROLOGY | Facility: CLINIC | Age: 54
End: 2019-11-26
Payer: COMMERCIAL

## 2021-05-28 ENCOUNTER — RECORDS - HEALTHEAST (OUTPATIENT)
Dept: ADMINISTRATIVE | Facility: CLINIC | Age: 56
End: 2021-05-28

## 2021-05-30 ENCOUNTER — RECORDS - HEALTHEAST (OUTPATIENT)
Dept: ADMINISTRATIVE | Facility: CLINIC | Age: 56
End: 2021-05-30